# Patient Record
Sex: FEMALE | Race: WHITE | Employment: FULL TIME | URBAN - METROPOLITAN AREA
[De-identification: names, ages, dates, MRNs, and addresses within clinical notes are randomized per-mention and may not be internally consistent; named-entity substitution may affect disease eponyms.]

---

## 2018-05-15 ENCOUNTER — APPOINTMENT (OUTPATIENT)
Dept: GENERAL RADIOLOGY | Facility: HOSPITAL | Age: 48
End: 2018-05-15
Attending: NURSE PRACTITIONER
Payer: COMMERCIAL

## 2018-05-15 ENCOUNTER — HOSPITAL ENCOUNTER (EMERGENCY)
Facility: HOSPITAL | Age: 48
Discharge: HOME | End: 2018-05-15
Payer: COMMERCIAL

## 2018-05-15 VITALS
WEIGHT: 145.94 LBS | TEMPERATURE: 98 F | BODY MASS INDEX: 21.62 KG/M2 | HEART RATE: 63 BPM | SYSTOLIC BLOOD PRESSURE: 117 MMHG | DIASTOLIC BLOOD PRESSURE: 76 MMHG | HEIGHT: 69 IN | OXYGEN SATURATION: 98 % | RESPIRATION RATE: 11 BRPM

## 2018-05-15 DIAGNOSIS — R07.89 CHEST PAIN, NON-CARDIAC: Primary | ICD-10-CM

## 2018-05-15 PROCEDURE — 36415 COLL VENOUS BLD VENIPUNCTURE: CPT

## 2018-05-15 PROCEDURE — 80048 BASIC METABOLIC PNL TOTAL CA: CPT | Performed by: EMERGENCY MEDICINE

## 2018-05-15 PROCEDURE — 84484 ASSAY OF TROPONIN QUANT: CPT | Performed by: EMERGENCY MEDICINE

## 2018-05-15 PROCEDURE — 93005 ELECTROCARDIOGRAM TRACING: CPT

## 2018-05-15 PROCEDURE — 99285 EMERGENCY DEPT VISIT HI MDM: CPT

## 2018-05-15 PROCEDURE — 85379 FIBRIN DEGRADATION QUANT: CPT | Performed by: NURSE PRACTITIONER

## 2018-05-15 PROCEDURE — 93010 ELECTROCARDIOGRAM REPORT: CPT | Performed by: EMERGENCY MEDICINE

## 2018-05-15 PROCEDURE — 85025 COMPLETE CBC W/AUTO DIFF WBC: CPT | Performed by: EMERGENCY MEDICINE

## 2018-05-15 PROCEDURE — 71046 X-RAY EXAM CHEST 2 VIEWS: CPT | Performed by: NURSE PRACTITIONER

## 2018-05-15 NOTE — ED PROVIDER NOTES
Patient Seen in: [facility] AND [facility] Emergency Department    History   Patient presents with:  Chest Pain Angina (cardiovascular)    Stated Complaint: chest pain s/p vein procedure    HPI    Patient presents into the emergency room for evaluation of chest left chest tightness with deep inspiration. On Arrival into the emergency room, patient states she has minimal chest discomfort.   Past Medical History:   Diagnosis Date   • History of skin cancer    • Pregnancy      x 3       Past Surgical History: normal. She has no wheezes. She has no rales. She exhibits no tenderness. Abdominal: Bowel sounds are normal. There is no tenderness. There is no rebound and no guarding. Musculoskeletal: Normal range of motion.    Neurological: She is alert and oriente Value Ref Range   Glucose 122 (H) 70 - 99 mg/dL   Sodium 139 136 - 144 mmol/L   Potassium 3.8 3.3 - 5.1 mmol/L   Chloride 106 95 - 110 mmol/L   CO2 27 22 - 32 mmol/L   BUN 8 8 - 20 mg/dL   Creatinine 0.75 0.50 - 1.50 mg/dL   Calcium, Total 9.1 8.5 - 10.5 m 0.0 - 0.7 K/UL   Basophil Absolute 0.0 0.0 - 0.2 K/UL     5:17 PM  Patient notified of the results her labs, and chest x-ray.   Patient has no risk factors for cardiac disease, and given her upper respiratory symptoms, was notified this may be pleurisy rela

## 2018-05-15 NOTE — ED INITIAL ASSESSMENT (HPI)
The patient reports having chest \"cramping\" under her left breast starting at 0730 this morning which lasted for about 10 minutes.  The pain return at 1000 am with severe shortness of breath where she could not speak in full sentences until 1115 am when s

## 2018-05-15 NOTE — ED NOTES
The patient adds that she does feel some chest discomfort with deep breaths. Patient denies recent long distance travel.

## (undated) NOTE — ED AVS SNAPSHOT
Apolinar Martinez   MRN: [id number]    Department:  [facility] Emergency Department   Date of Visit:  5/15/2018           Disclosure     Insurance plans vary and the physician(s) referred by the ER may not be covered by your plan.  Please contact CARE PHYSICIAN AT ONCE OR RETURN IMMEDIATELY TO THE EMERGENCY DEPARTMENT. If you have been prescribed any medication(s), please fill your prescription right away and begin taking the medication(s) as directed.   If you believe that any of the medications